# Patient Record
Sex: MALE | Race: BLACK OR AFRICAN AMERICAN | Employment: PART TIME | URBAN - METROPOLITAN AREA
[De-identification: names, ages, dates, MRNs, and addresses within clinical notes are randomized per-mention and may not be internally consistent; named-entity substitution may affect disease eponyms.]

---

## 2018-03-12 ENCOUNTER — PAT TELEPHONE (OUTPATIENT)
Dept: PREADMISSION TESTING | Age: 33
End: 2018-03-12

## 2018-03-12 ENCOUNTER — OFFICE VISIT (OUTPATIENT)
Dept: ORTHOPEDIC SURGERY | Age: 33
End: 2018-03-12

## 2018-03-12 VITALS — BODY MASS INDEX: 20.83 KG/M2 | WEIGHT: 125 LBS | HEIGHT: 65 IN

## 2018-03-12 VITALS
DIASTOLIC BLOOD PRESSURE: 72 MMHG | HEART RATE: 101 BPM | HEIGHT: 64 IN | BODY MASS INDEX: 21.34 KG/M2 | WEIGHT: 125 LBS | SYSTOLIC BLOOD PRESSURE: 112 MMHG | RESPIRATION RATE: 16 BRPM

## 2018-03-12 DIAGNOSIS — S62.336D CLOSED DISPLACED FRACTURE OF NECK OF FIFTH METACARPAL BONE OF RIGHT HAND WITH ROUTINE HEALING: ICD-10-CM

## 2018-03-12 PROCEDURE — 99203 OFFICE O/P NEW LOW 30 MIN: CPT | Performed by: PHYSICIAN ASSISTANT

## 2018-03-12 NOTE — PROGRESS NOTES
the distal Small Finger Metacarpal  on the Right, normal on the Left. The distal radius & ulna are not tender to palpation. There is a 0 degree angular deformity and no clinical evidence of  mal-rotation. Radiographic Evaluation:  Radiographs are reviewed  today (3 views of the right hand). They demonstrate evidence of an acute fracture of the distal  Small Finger Metacarpal  with no comminution, and moderate displcaement. There is not evidence of other injury or bony fracture. Impression:  Mr. Summer Rodas has sustained recent metacarpal fracture, displaced and presents requesting further treatment. Plan:  I have discussed with Mr. Summer Rodas the various treatment options for treatment of right  Small Finger Metacarpal fracture. We discussed the options of Closed treatment in situ, attempted closed reduction & cast immobilization, and Open Reduction & Internal Fixation of the fracture. I have explained the pre-, jaqui- ane post-operative considerations to him.  he has elected to proceed with surgical treatment Closed Reduction & Percutaneous Pinning of his fracture. He has voiced an understanding of the other options available to him. I have explained the complications, limitations, expectations, alternatives, & risks of his chosen treatment. We discussed the possibility of residual symptoms as may be related to surgical treatment of fractures including the possiblities of: mal-union, non-union, delayed union, persistant deformity, persistant pain, limitation of motion, future arthritic symptoms & the possible need for further treatment. We also specifically discussed risks related to any surgical procedure including: bleeding, infection, scar formation, & possible need for repeat operation. He understood our discussion and was comfortable with his decision; he was provided with appropriate expectations.     I had an extensive discussion with Mr. Summer Rodas  and any family members present regarding the natural history, etiology, and long term consequences of this problem. I have outlined a treatment plan with them and, in my opinion, surgical intervention is indicated at this time. I have discussed with them the potential complications, limitations, expectations, alternatives, and risks of the procedure. He has had full opportunity to ask their questions. I have answered them all to his satisfaction. I feel that the patient and any present family members do understand our discussion today and he has provided informed consent for Closed Reduction & Percutaneous Pinning of his  right  Small Finger Metacarpal fracture. He is appropriately immobilized and protected until the time of the scheduled surgery. He is specifically instructed to contact the office between now & his scheduled appointment if he has concerns related to the cast or the underlying fracture. He is welcome to call for an appointment sooner if he has any additional concerns or questions. I have also discussed with Mr. Sheila Obando the other treatment options available to him for this condition. We have today selected to proceed with Surgical treatment. He has voiced and  understanding that there are other less aggressive treatment options which are available in this situation, albeit possibly less efficacious or durable, and he is comfortable with the plan that he has chosen. Mr. Sheila Obando has been given a full verbal list of instructions and precautions related to his present condition. I have asked him to followup with me in the office at the prescribed time. He is also specifically requested to call or return to the office sooner if his symptoms change or worsen prior to the next scheduled appointment.

## 2018-03-12 NOTE — LETTER
[facility]  Surgery Scheduling Form:      DEMOGRAPHICS:                                                                                                              .    Patient Name:  Yakov Hamm  Patient :  1985   Patient SS#:  [id number]    Patient Phone:  [phone] (home)    Patient Address:  [place] [address] [place] [id number]    PCP:  Referring Not In System (Inactive)  Insurance:  Payor: / No coverage found. Insurance ID Number:    DIAGNOSIS & PROCEDURE:                                                                                            .    Diagnosis:   right Small Finger Metacarpal Fracture  (815.00)  Operation:  Closed Reduction & Percutaneous Pinning of right Small Finger Metacarpal Fracture  Location:  [facility]  Surgeon:  Corazon Sesay    SCHEDULING INFORMATION:                                                                                         .  Surgeon's Scheduling Instruction:  within 7 days    Requested Date:  3-13  OR Time:  1:00 Patient Arrival Time:  11:30    OR Time Required:  45  Minutes  Anesthesia:  General  Equipment:  K-Wires, TPS  Mini C-Arm:  Yes   Standard C-Arm:  No  Status:  outpatient  PAT Required:  Yes  Comments:                      New Post President.  Stephani Portillo MD  3/12/18   BILLING INFORMATION:                                                                                                    .    Procedure:       CPT Code Modifier
? Anesthetic and/or Medical Risks  ? 4. I have also been informed by the informing physician that there are other risks from both known and unknown causes that are attendant to the performance of any surgical procedure. I am aware that the practice of medicine and surgery is not an exact science, and that no guarantees have been made to me concerning the results of the operation and/or procedure(s). 5. I   CONSENT / REFUSE CONSENT  (strike the phrase that does not apply) to the taking of photographs before, during and/or after the operation or procedure for scientific/educational purposes. 6. I consent to the administration of anesthesia and to the use of such anesthetics as may be deemed advisable by the anesthesiologist who has been engaged by me or my physician. 7. I certify that I have read and understand the above consent to operation and/or other procedure(s); that the explanations therein referred to were made to me by the informing physician in advance of my signing this consent; that all blanks or statements requiring insertion or completion were filled in and inapplicable paragraphs, if any, were stricken before I signed; and that all questions asked by me about the operation and/or procedure(s) which I have consented to have been fully answered in a satisfactory manner.               _______________________  Witness        Signature Of Patient          Juli President. Stephani Portillo      _______________________  Informing Physician         Signature of Informing Physician           If patient is unable to sign or is a minor, complete one of the following:    (A)  Patient is a minor   years of age. (B)  Patient is unable to sign because: The undersigned represents that he or she is duly authorized to execute this consent for and on behalf of the above named patient.                Witness               o  Parent  o  Guardian   o  Spouse       o  Other (specify)

## 2018-03-13 ENCOUNTER — HOSPITAL ENCOUNTER (OUTPATIENT)
Dept: SURGERY | Age: 33
Discharge: HOME | End: 2018-03-13
Attending: ORTHOPAEDIC SURGERY | Admitting: ORTHOPAEDIC SURGERY

## 2018-03-13 VITALS
SYSTOLIC BLOOD PRESSURE: 103 MMHG | HEART RATE: 75 BPM | RESPIRATION RATE: 16 BRPM | DIASTOLIC BLOOD PRESSURE: 69 MMHG | WEIGHT: 128.64 LBS | TEMPERATURE: 97 F | BODY MASS INDEX: 21.41 KG/M2 | OXYGEN SATURATION: 99 %

## 2018-03-13 DIAGNOSIS — S62.336D CLOSED DISPLACED FRACTURE OF NECK OF FIFTH METACARPAL BONE OF RIGHT HAND WITH ROUTINE HEALING: Primary | ICD-10-CM

## 2018-03-13 RX ORDER — OXYCODONE HYDROCHLORIDE AND ACETAMINOPHEN 5; 325 MG/1; MG/1
1 TABLET ORAL PRN
Status: COMPLETED | OUTPATIENT
Start: 2018-03-13 | End: 2018-03-13

## 2018-03-13 RX ORDER — SODIUM CHLORIDE 9 MG/ML
INJECTION, SOLUTION INTRAVENOUS CONTINUOUS
Status: DISCONTINUED | OUTPATIENT
Start: 2018-03-13 | End: 2018-03-14 | Stop reason: HOSPADM

## 2018-03-13 RX ORDER — MORPHINE SULFATE 2 MG/ML
2 INJECTION, SOLUTION INTRAMUSCULAR; INTRAVENOUS EVERY 5 MIN PRN
Status: DISCONTINUED | OUTPATIENT
Start: 2018-03-13 | End: 2018-03-14 | Stop reason: HOSPADM

## 2018-03-13 RX ORDER — DIPHENHYDRAMINE HYDROCHLORIDE 50 MG/ML
12.5 INJECTION INTRAMUSCULAR; INTRAVENOUS ONCE
Status: COMPLETED | OUTPATIENT
Start: 2018-03-13 | End: 2018-03-13

## 2018-03-13 RX ORDER — FENTANYL CITRATE 50 UG/ML
50 INJECTION, SOLUTION INTRAMUSCULAR; INTRAVENOUS EVERY 5 MIN PRN
Status: DISCONTINUED | OUTPATIENT
Start: 2018-03-13 | End: 2018-03-14 | Stop reason: HOSPADM

## 2018-03-13 RX ORDER — ONDANSETRON 2 MG/ML
4 INJECTION INTRAMUSCULAR; INTRAVENOUS
Status: ACTIVE | OUTPATIENT
Start: 2018-03-13 | End: 2018-03-13

## 2018-03-13 RX ORDER — SODIUM CHLORIDE 0.9 % (FLUSH) 0.9 %
10 SYRINGE (ML) INJECTION PRN
Status: DISCONTINUED | OUTPATIENT
Start: 2018-03-13 | End: 2018-03-14 | Stop reason: HOSPADM

## 2018-03-13 RX ORDER — DIPHENHYDRAMINE HYDROCHLORIDE 50 MG/ML
INJECTION INTRAMUSCULAR; INTRAVENOUS
Status: DISCONTINUED
Start: 2018-03-13 | End: 2018-03-14 | Stop reason: HOSPADM

## 2018-03-13 RX ORDER — MEPERIDINE HYDROCHLORIDE 25 MG/ML
12.5 INJECTION INTRAMUSCULAR; INTRAVENOUS; SUBCUTANEOUS EVERY 5 MIN PRN
Status: DISCONTINUED | OUTPATIENT
Start: 2018-03-13 | End: 2018-03-14 | Stop reason: HOSPADM

## 2018-03-13 RX ORDER — SODIUM CHLORIDE 0.9 % (FLUSH) 0.9 %
10 SYRINGE (ML) INJECTION EVERY 12 HOURS SCHEDULED
Status: DISCONTINUED | OUTPATIENT
Start: 2018-03-13 | End: 2018-03-14 | Stop reason: HOSPADM

## 2018-03-13 RX ORDER — FENTANYL CITRATE 50 UG/ML
25 INJECTION, SOLUTION INTRAMUSCULAR; INTRAVENOUS EVERY 5 MIN PRN
Status: DISCONTINUED | OUTPATIENT
Start: 2018-03-13 | End: 2018-03-14 | Stop reason: HOSPADM

## 2018-03-13 RX ORDER — OXYCODONE HYDROCHLORIDE AND ACETAMINOPHEN 5; 325 MG/1; MG/1
2 TABLET ORAL PRN
Status: COMPLETED | OUTPATIENT
Start: 2018-03-13 | End: 2018-03-13

## 2018-03-13 RX ORDER — HYDROCODONE BITARTRATE AND ACETAMINOPHEN 5; 325 MG/1; MG/1
1 TABLET ORAL EVERY 6 HOURS PRN
Qty: 20 TABLET | Refills: 0 | Status: SHIPPED | OUTPATIENT
Start: 2018-03-13 | End: 2018-03-26 | Stop reason: SDUPTHER

## 2018-03-13 RX ORDER — MORPHINE SULFATE 2 MG/ML
1 INJECTION, SOLUTION INTRAMUSCULAR; INTRAVENOUS EVERY 5 MIN PRN
Status: DISCONTINUED | OUTPATIENT
Start: 2018-03-13 | End: 2018-03-14 | Stop reason: HOSPADM

## 2018-03-13 RX ADMIN — FENTANYL CITRATE 50 MCG: 50 INJECTION, SOLUTION INTRAMUSCULAR; INTRAVENOUS at 13:20

## 2018-03-13 RX ADMIN — DIPHENHYDRAMINE HYDROCHLORIDE 12.5 MG: 50 INJECTION INTRAMUSCULAR; INTRAVENOUS at 13:33

## 2018-03-13 RX ADMIN — MORPHINE SULFATE 2 MG: 2 INJECTION, SOLUTION INTRAMUSCULAR; INTRAVENOUS at 13:03

## 2018-03-13 RX ADMIN — OXYCODONE HYDROCHLORIDE AND ACETAMINOPHEN 2 TABLET: 5; 325 TABLET ORAL at 14:07

## 2018-03-13 RX ADMIN — FENTANYL CITRATE 50 MCG: 50 INJECTION, SOLUTION INTRAMUSCULAR; INTRAVENOUS at 13:09

## 2018-03-13 RX ADMIN — SODIUM CHLORIDE: 9 INJECTION, SOLUTION INTRAVENOUS at 11:40

## 2018-03-13 ASSESSMENT — PAIN SCALES - GENERAL
PAINLEVEL_OUTOF10: 10
PAINLEVEL_OUTOF10: 9
PAINLEVEL_OUTOF10: 7
PAINLEVEL_OUTOF10: 10
PAINLEVEL_OUTOF10: 9
PAINLEVEL_OUTOF10: 0

## 2018-03-13 ASSESSMENT — PAIN DESCRIPTION - PAIN TYPE: TYPE: SURGICAL PAIN

## 2018-03-13 ASSESSMENT — PAIN DESCRIPTION - LOCATION: LOCATION: HAND

## 2018-03-13 ASSESSMENT — LIFESTYLE VARIABLES: SMOKING_STATUS: 1

## 2018-03-13 ASSESSMENT — ENCOUNTER SYMPTOMS: SHORTNESS OF BREATH: 0

## 2018-03-13 ASSESSMENT — PAIN DESCRIPTION - ORIENTATION: ORIENTATION: RIGHT

## 2018-03-13 ASSESSMENT — PAIN - FUNCTIONAL ASSESSMENT: PAIN_FUNCTIONAL_ASSESSMENT: 0-10

## 2018-03-13 NOTE — ANESTHESIA PRE-OP
GLUCOSE, PROT, CALCIUM, BILITOT, ALKPHOS, AST, ALT  BMP  No results found for: NA, K, CL, CO2, BUN, CREATININE, CALCIUM, GFRAA, LABGLOM, GLUCOSE  POCGlucose  No results for input(s): GLUCOSE in the last 72 hours. Coags  No results found for: PROTIME, INR, APTT  HCG (If Applicable) No results found for: PREGTESTUR, PREGSERUM, HCG, HCGQUANT   ABGs No results found for: PHART, PO2ART, HHG4PTA, IXF9SLI, BEART, B6RYZLUX   Type & Screen (If Applicable)  No results found for: LABABO, [address]    Surgeon:    Procedure:    Medications prior to admission:   Prior to Admission medications    Medication Sig Start Date End Date Taking?  Authorizing Provider   ibuprofen (ADVIL;MOTRIN) 600 MG tablet Take 1 tablet by mouth every 8 hours as needed for Pain 3/4/18   Radha Epps MD   naproxen (NAPROSYN) 375 MG tablet Take 1 tablet by mouth 2 times daily 8/21/17   Millie Russell MD       Current medications:    Current Outpatient Prescriptions   Medication Sig Dispense Refill    ibuprofen (ADVIL;MOTRIN) 600 MG tablet Take 1 tablet by mouth every 8 hours as needed for Pain 20 tablet 0    naproxen (NAPROSYN) 375 MG tablet Take 1 tablet by mouth 2 times daily 17 tablet 0     Current Facility-Administered Medications   Medication Dose Route Frequency Provider Last Rate Last Dose    0.9 % sodium chloride infusion   Intravenous Continuous Leydi Pineda MD        sodium chloride flush 0.9 % injection 10 mL  10 mL Intravenous 2 times per day Leydi Pineda MD        sodium chloride flush 0.9 % injection 10 mL  10 mL Intravenous PRN Leydi Pineda MD        famotidine (PEPCID) injection 20 mg  20 mg Intravenous Once Leydi Pineda MD        ceFAZolin (ANCEF) 2 g in dextrose 5 % 100 mL IVPB  2 g Intravenous Once Blake MD Palomo        fentaNYL (SUBLIMAZE) injection 25 mcg  25 mcg Intravenous Q5 Min PRN Lexie Bueno MD        fentaNYL (SUBLIMAZE) injection 50 mcg  50 mcg Intravenous Q5 Min PRN Carol Goltz MD Jennifer        morphine (PF) injection 1 mg  1 mg Intravenous Q5 Min PRN Zhane Abraham MD        morphine (PF) injection 2 mg  2 mg Intravenous Q5 Min PRN Zhane Abraham MD        oxyCODONE-acetaminophen (PERCOCET) 5-325 MG per tablet 1 tablet  1 tablet Oral PRN Zhane Abraham MD        Or    oxyCODONE-acetaminophen (PERCOCET) 5-325 MG per tablet 2 tablet  2 tablet Oral PRN Zhane Abraham MD        ondansetron [facility]) injection 4 mg  4 mg Intravenous Once PRN Zhane Abraham MD        meperidine (DEMEROL) injection 12.5 mg  12.5 mg Intravenous Q5 Min PRN Zhane Abraham MD           Allergies:  No Known Allergies    Problem List:    Patient Active Problem List   Diagnosis Code    Closed displaced fracture of neck of fifth metacarpal bone of right hand with routine healing S62.336D       Past Medical History:  No past medical history on file. Past Surgical History:        Procedure Laterality Date    FRACTURE SURGERY      2003 I/D infection       Social History:    Social History   Substance Use Topics    Smoking status: Current Every Day Smoker     Packs/day: 0.50     Years: 10.00    Smokeless tobacco: Current User    Alcohol use Yes      Comment: SOCIAL                                Ready to quit: Not Answered  Counseling given: Not Answered      Vital Signs (Current): There were no vitals filed for this visit. BP Readings from Last 3 Encounters:   03/12/18 112/72   03/04/18 107/71   08/21/17 (!) 131/95       NPO Status:  mn+, see mar                                                                               BMI:   Wt Readings from Last 3 Encounters:   03/12/18 125 lb (56.7 kg)   03/12/18 125 lb (56.7 kg)   03/04/18 125 lb 3.5 oz (56.8 kg)     There is no height or weight on file to calculate BMI.     Anesthesia Evaluation  Patient summary reviewed no history of anesthetic complications: Airway: Mallampati: II  TM distance: >3 FB     Mouth opening: > = 3 FB Dental:          Pulmonary:   (+) current smoker (1/2 ppd)    (-) COPD, asthma and shortness of breath          Patient smoked on day of surgery. Cardiovascular:Negative CV ROS            Rhythm: regular  Rate: normal           Beta Blocker:  Not on Beta Blocker         Neuro/Psych:   Negative Neuro/Psych ROS  (+) psychiatric history (MJ use , this early am):            GI/Hepatic/Renal: Neg GI/Hepatic/Renal ROS            Endo/Other: Negative Endo/Other ROS                    Abdominal:           Vascular:                                      Anesthesia Plan      general     ASA 2       Induction: intravenous. MIPS: Postoperative opioids intended and Prophylactic antiemetics administered. Anesthetic plan and risks discussed with patient and mother. Plan discussed with CRNA. This pre-anesthesia assessment may be used as a history and physical.    DOS STAFF ADDENDUM:    Pt seen and examined, chart reviewed (including anesthesia, drug and allergy history). No interval changes to history and physical examination. Anesthetic plan, risks, benefits, alternatives, and personnel involved discussed with patient. Patient verbalized an understanding and agrees to proceed.       Macrina Sanchez MD  March 13, 2018  11:15 AM      Macrina Sanchez MD   3/13/2018

## 2018-03-13 NOTE — OP NOTE
OPERATIVE REPORT              . Patient:  Tamara Pan    YOB: 1985  Date of Service:  3/13/2018   Location:  [facility]    Preoperative Diagnosis:  Right Small Finger Metacarpal  Neck unstable fracture      Postoperative Diagnosis:  Same      Procedure:  Closed reduction and percutaneous pinning of Right Small Finger Metacarpal  Neck fracture    Surgeon: Dariusz Lee MD    Anesthesia:  General         Blood Loss: Minimal    Complications: None                                                           Indications:  Mr. Tamara Pan  is a 28y.o. year old male with a Right Small Finger Metacarpal  Neck fracture which is unstable. I have discussed preoperatively with him  the complications, limitations, expectations, alternatives, and risks of surgical care. Mr. Tamara Pan has provided written informed consent to proceed. Procedure: After written consent was obtained and the proper site was identified and marked, Mr. Tamara Pan  was brought to the operating room and placed in the supine position on the operating room table. The Right arm was extended upon a hand table. A dose of preoperative antibiotics was administered and the Right upper extremity was prepped and draped in the usual sterile fashion. Under fluoroscopic guidance, a closed reduction of the Metacarpal  fracture was performed.  2 0.045 inch K-wires were percutaneously placed securing the fracture fragments in anatomic alignment, assuring that there was no distraction or malrotation. Final fluoroscopic images demonstrated anatomic alignment and appropriate reduction of the fracture fragments. The K-wires were checked for appropriate position and dimension. The wire ends were bent, cut short, & protective caps were applied. Local anesthestic was instilled for post-operative analgesia.  The pin sites were dressed with Adaptic, dry sterile dressings, and a very well padded short arm fiberglass cast was applied

## 2018-03-13 NOTE — ANESTHESIA POST-OP
[facility] Department of Anesthesiology  Post-Anesthesia Note       Name:  Feliberto Zheng                                         Age:  28 y.o.   MRN:  [id number]     Last Vitals & Oxygen Saturation: /69   Pulse 75   Temp 97 °F (36.1 °C) (Oral)   Resp 16   Wt 128 lb 10.2 oz (58.3 kg)   SpO2 99%   BMI 21.41 kg/m²   Patient Vitals for the past 4 hrs:   BP Temp Temp src Pulse Resp SpO2   03/13/18 1347 103/69 97 °F (36.1 °C) Oral 75 16 99 %   03/13/18 1257 (!) 121/94 - - 75 12 100 %   03/13/18 1251 - - - 78 14 100 %   03/13/18 1250 (!) 113/90 - - 77 14 100 %   03/13/18 1249 - 97.1 °F (36.2 °C) Temporal 78 - 100 %       Level of consciousness: awake, alert and oriented    Respiratory: stable     Cardiovascular: stable     Hydration: stable     PONV: stable     Post-op pain: adequate analgesia    Post-op assessment: no apparent anesthetic complications and tolerated procedure well    Complications:  none    Macrina Sanchez MD  March 13, 2018   4:26 PM

## 2018-03-13 NOTE — PROGRESS NOTES
1.Verify patient. 2.Provide d/c instructions. 3.Understanding of d/c instructions. 4.Asess post-op  nausea , vomiting and pain. 5.Maintain core body temperature 96.8 or above. 6.Demonstrates post-op exercises. 7.Receive copy of d/c instructions. 8.Return to Pre anesthetic LOC/sensorimotor activity  9. IV d/c prior to d/c. Site unremarkable  10. Follow standard of care.

## 2018-03-14 ENCOUNTER — HOSPITAL ENCOUNTER (OUTPATIENT)
Dept: GENERAL RADIOLOGY | Age: 33
Discharge: HOME | End: 2018-03-15

## 2018-03-14 DIAGNOSIS — S62.336A CLOSED DISPLACED FRACTURE OF NECK OF FIFTH METACARPAL BONE OF RIGHT HAND, INITIAL ENCOUNTER: ICD-10-CM

## 2018-03-23 ENCOUNTER — OFFICE VISIT (OUTPATIENT)
Dept: ORTHOPEDIC SURGERY | Age: 33
End: 2018-03-23

## 2018-03-23 VITALS — WEIGHT: 125 LBS | RESPIRATION RATE: 16 BRPM | BODY MASS INDEX: 21.34 KG/M2 | HEIGHT: 64 IN

## 2018-03-23 DIAGNOSIS — S62.336D CLOSED DISPLACED FRACTURE OF NECK OF FIFTH METACARPAL BONE OF RIGHT HAND WITH ROUTINE HEALING: Primary | ICD-10-CM

## 2018-03-23 PROCEDURE — 29075 APPL CST ELBW FNGR SHORT ARM: CPT | Performed by: PHYSICIAN ASSISTANT

## 2018-03-23 PROCEDURE — 99024 POSTOP FOLLOW-UP VISIT: CPT | Performed by: PHYSICIAN ASSISTANT

## 2018-03-23 NOTE — PROGRESS NOTES
Rosy Soria PA-C. ordered a spica cast  to be applied to [facility] Eitan's right upper Hand. I applied a spica cast cast to [facility] Eitan's right upper Hand. I applied 1 rolls of under padding in a 50/50 fashion. The Ayse Wing requested black color fiberglass. I rolled 2 rolls of fiberglass in a 50/50 fashion. His right upper Hand is in a ulnar gutter position short arm fiberglass cast incorporating the Ring Finger and Small Finger in an intrinsic safe position Marcos Isaac PA-C . Angelo Laird's nail beds are pink in color, the extremity is warm to the touch. Capillary refill is less than 2 seconds. Ayse Wing instructed on proper care of cast.  Do not get wet, keep all items out of cast.  If cast is painful please make appointment to get checked. Patient also briefed on circulation compromise. If digits are cold, blue, and tingling patient must must seek care. If after hours patient is to go to Emergency Room. During office hours patient must come in to office.

## 2018-03-26 ENCOUNTER — TELEPHONE (OUTPATIENT)
Dept: ORTHOPEDIC SURGERY | Age: 33
End: 2018-03-26

## 2018-03-26 DIAGNOSIS — S62.336D CLOSED DISPLACED FRACTURE OF NECK OF FIFTH METACARPAL BONE OF RIGHT HAND WITH ROUTINE HEALING: ICD-10-CM

## 2018-03-26 RX ORDER — HYDROCODONE BITARTRATE AND ACETAMINOPHEN 5; 325 MG/1; MG/1
1 TABLET ORAL EVERY 6 HOURS PRN
Qty: 20 TABLET | Refills: 0 | Status: SHIPPED | OUTPATIENT
Start: 2018-03-26 | End: 2018-04-02

## 2018-04-23 ENCOUNTER — OFFICE VISIT (OUTPATIENT)
Dept: ORTHOPEDIC SURGERY | Age: 33
End: 2018-04-23

## 2018-04-23 VITALS — RESPIRATION RATE: 16 BRPM | HEIGHT: 64 IN | WEIGHT: 125 LBS | BODY MASS INDEX: 21.34 KG/M2

## 2018-04-23 DIAGNOSIS — S62.336D CLOSED DISPLACED FRACTURE OF NECK OF FIFTH METACARPAL BONE OF RIGHT HAND WITH ROUTINE HEALING: Primary | ICD-10-CM

## 2018-04-23 PROCEDURE — 99024 POSTOP FOLLOW-UP VISIT: CPT | Performed by: PHYSICIAN ASSISTANT

## 2018-04-23 PROCEDURE — APPSS15 APP SPLIT SHARED TIME 0-15 MINUTES: Performed by: PHYSICIAN ASSISTANT

## 2018-11-07 ENCOUNTER — HOSPITAL ENCOUNTER (EMERGENCY)
Age: 33
Discharge: HOME | End: 2018-11-07
Attending: EMERGENCY MEDICINE

## 2018-11-07 VITALS
WEIGHT: 131.39 LBS | RESPIRATION RATE: 12 BRPM | HEART RATE: 86 BPM | SYSTOLIC BLOOD PRESSURE: 118 MMHG | DIASTOLIC BLOOD PRESSURE: 74 MMHG | OXYGEN SATURATION: 97 % | TEMPERATURE: 98.1 F | BODY MASS INDEX: 22.55 KG/M2

## 2018-11-07 DIAGNOSIS — L03.211 CELLULITIS OF FACE: Primary | ICD-10-CM

## 2018-11-07 PROCEDURE — 99282 EMERGENCY DEPT VISIT SF MDM: CPT

## 2018-11-07 RX ORDER — SULFAMETHOXAZOLE AND TRIMETHOPRIM 800; 160 MG/1; MG/1
1 TABLET ORAL 2 TIMES DAILY
Qty: 20 TABLET | Refills: 0 | Status: SHIPPED | OUTPATIENT
Start: 2018-11-07 | End: 2018-11-17

## 2018-11-07 NOTE — ED PROVIDER NOTES
HEAD: Normocephalic. Atraumatic. EYES: EOM's grossly intact. Sclera anicteric. ENT: Mucous membranes are moist. Tolerates saliva. No trismus. NECK: Supple. No meningismus. Trachea midline. HEART: RRR. Radial pulses 2+. LUNGS: Respirations unlabored. CTAB  ABDOMEN: Soft. Non-tender. No guarding or rebound. EXTREMITIES: No acute deformities. SKIN: Warm and dry. The patient has a 2 x 2 similar area of cellulitis to the right shin without abscess  NEUROLOGICAL: No gross facial drooping. Moves all 4 extremities spontaneously. PSYCHIATRIC: Normal mood. I have reviewed and interpreted all of the currently available lab results from this visit (if applicable):  No results found for this visit on 11/07/18. Radiographs (if obtained):  [] The following radiograph was interpreted by myself in the absence of a radiologist:  [x] Radiologist's Report Reviewed:  n/a    EKG (if obtained): (All EKG's are interpreted by myself in the absence of a cardiologist)  Initial EKG on my interpretation shows n/a. MDM:  Differential diagnosis: Cellulitis, abscess, sepsis    The patient has no abscess; I do not believe he is septic based on vitals. He clearly does have a chin abscess from where he scratched himself on Friday and perceives take care of it with \"a rag\". I will clean it in the ED and then discharged on topical Bactroban as well as Bactrim along with close PCP follow-up. Discussed results, diagnosis and plan with patient and/or family. Questions addressed. Disposition and follow-up agreed upon. Specific discharge instructions explained. The patient and/or family and I have discussed the diagnosis and risks, and we agree with discharging home to follow-up with their primary care, specialist or referral doctor. In the event that medications were prescribed the risk profile of these medications were detailed expressly.  We also discussed returning to the Emergency Department immediately if new or worsening

## 2019-12-28 ENCOUNTER — HOSPITAL ENCOUNTER (EMERGENCY)
Age: 34
Discharge: HOME | End: 2019-12-28
Attending: EMERGENCY MEDICINE

## 2019-12-28 ENCOUNTER — APPOINTMENT (OUTPATIENT)
Dept: GENERAL RADIOLOGY | Age: 34
End: 2019-12-28

## 2019-12-28 VITALS
RESPIRATION RATE: 16 BRPM | WEIGHT: 134.48 LBS | BODY MASS INDEX: 22.96 KG/M2 | HEART RATE: 84 BPM | HEIGHT: 64 IN | DIASTOLIC BLOOD PRESSURE: 60 MMHG | SYSTOLIC BLOOD PRESSURE: 93 MMHG | OXYGEN SATURATION: 97 % | TEMPERATURE: 99.1 F

## 2019-12-28 DIAGNOSIS — J06.9 ACUTE UPPER RESPIRATORY INFECTION: Primary | ICD-10-CM

## 2019-12-28 LAB
A/G RATIO: 1.1 (ref 1.1–2.2)
ALBUMIN SERPL-MCNC: 3.7 G/DL (ref 3.4–5)
ALP BLD-CCNC: 63 U/L (ref 40–129)
ALT SERPL-CCNC: 23 U/L (ref 10–40)
ANION GAP SERPL CALCULATED.3IONS-SCNC: 14 MMOL/L (ref 3–16)
AST SERPL-CCNC: 30 U/L (ref 15–37)
BASOPHILS ABSOLUTE: 0.1 K/UL (ref 0–0.2)
BASOPHILS RELATIVE PERCENT: 0.9 %
BILIRUB SERPL-MCNC: 0.3 MG/DL (ref 0–1)
BUN BLDV-MCNC: 7 MG/DL (ref 7–20)
CALCIUM SERPL-MCNC: 9.2 MG/DL (ref 8.3–10.6)
CHLORIDE BLD-SCNC: 100 MMOL/L (ref 99–110)
CO2: 24 MMOL/L (ref 21–32)
CREAT SERPL-MCNC: 1 MG/DL (ref 0.9–1.3)
EOSINOPHILS ABSOLUTE: 0 K/UL (ref 0–0.6)
EOSINOPHILS RELATIVE PERCENT: 0.5 %
GFR AFRICAN AMERICAN: >60
GFR NON-AFRICAN AMERICAN: >60
GLOBULIN: 3.5 G/DL
GLUCOSE BLD-MCNC: 106 MG/DL (ref 70–99)
HCT VFR BLD CALC: 42.5 % (ref 40.5–52.5)
HEMOGLOBIN: 14.2 G/DL (ref 13.5–17.5)
LYMPHOCYTES ABSOLUTE: 3.5 K/UL (ref 1–5.1)
LYMPHOCYTES RELATIVE PERCENT: 50.6 %
MCH RBC QN AUTO: 33.1 PG (ref 26–34)
MCHC RBC AUTO-ENTMCNC: 33.4 G/DL (ref 31–36)
MCV RBC AUTO: 99.3 FL (ref 80–100)
MONOCYTES ABSOLUTE: 0.6 K/UL (ref 0–1.3)
MONOCYTES RELATIVE PERCENT: 9.4 %
NEUTROPHILS ABSOLUTE: 2.6 K/UL (ref 1.7–7.7)
NEUTROPHILS RELATIVE PERCENT: 38.6 %
PDW BLD-RTO: 13 % (ref 12.4–15.4)
PLATELET # BLD: 338 K/UL (ref 135–450)
PMV BLD AUTO: 7.4 FL (ref 5–10.5)
POTASSIUM REFLEX MAGNESIUM: 4.2 MMOL/L (ref 3.5–5.1)
RBC # BLD: 4.28 M/UL (ref 4.2–5.9)
SODIUM BLD-SCNC: 138 MMOL/L (ref 136–145)
TOTAL PROTEIN: 7.2 G/DL (ref 6.4–8.2)
WBC # BLD: 6.8 K/UL (ref 4–11)

## 2019-12-28 PROCEDURE — 85025 COMPLETE CBC W/AUTO DIFF WBC: CPT

## 2019-12-28 PROCEDURE — 36415 COLL VENOUS BLD VENIPUNCTURE: CPT

## 2019-12-28 PROCEDURE — 99283 EMERGENCY DEPT VISIT LOW MDM: CPT

## 2019-12-28 PROCEDURE — 71046 X-RAY EXAM CHEST 2 VIEWS: CPT

## 2019-12-28 PROCEDURE — 80053 COMPREHEN METABOLIC PANEL: CPT

## 2019-12-28 RX ORDER — PREDNISONE 50 MG/1
50 TABLET ORAL DAILY
Qty: 5 TABLET | Refills: 0 | Status: SHIPPED | OUTPATIENT
Start: 2019-12-28 | End: 2020-01-02

## 2019-12-28 RX ORDER — LIDOCAINE 50 MG/G
1 PATCH TOPICAL DAILY
Qty: 4 PATCH | Refills: 0 | Status: SHIPPED | OUTPATIENT
Start: 2019-12-28 | End: 2020-01-01

## 2019-12-28 RX ORDER — ALBUTEROL SULFATE 90 UG/1
2 AEROSOL, METERED RESPIRATORY (INHALATION) EVERY 4 HOURS PRN
Qty: 1 INHALER | Refills: 0 | Status: SHIPPED | OUTPATIENT
Start: 2019-12-28

## 2019-12-28 RX ORDER — AZITHROMYCIN 250 MG/1
TABLET, FILM COATED ORAL
Qty: 1 PACKET | Refills: 0 | Status: SHIPPED | OUTPATIENT
Start: 2019-12-28 | End: 2020-01-01

## 2019-12-28 ASSESSMENT — ENCOUNTER SYMPTOMS
EYE ITCHING: 0
CHOKING: 0
CHEST TIGHTNESS: 0
RECTAL PAIN: 0
COUGH: 1
BACK PAIN: 0
ABDOMINAL PAIN: 0
VOMITING: 0
EYE REDNESS: 0
BLOOD IN STOOL: 0
STRIDOR: 0
RHINORRHEA: 1
DIARRHEA: 0
NAUSEA: 0
SHORTNESS OF BREATH: 0
ANAL BLEEDING: 0
ABDOMINAL DISTENTION: 0
EYE PAIN: 0
COLOR CHANGE: 0
CONSTIPATION: 0
APNEA: 0
WHEEZING: 0
EYE DISCHARGE: 0
PHOTOPHOBIA: 0

## 2019-12-28 ASSESSMENT — PAIN - FUNCTIONAL ASSESSMENT
PAIN_FUNCTIONAL_ASSESSMENT: 0-10
PAIN_FUNCTIONAL_ASSESSMENT: ACTIVITIES ARE NOT PREVENTED
PAIN_FUNCTIONAL_ASSESSMENT: ACTIVITIES ARE NOT PREVENTED

## 2019-12-28 ASSESSMENT — PAIN DESCRIPTION - PROGRESSION
CLINICAL_PROGRESSION: GRADUALLY WORSENING
CLINICAL_PROGRESSION: NOT CHANGED

## 2019-12-28 ASSESSMENT — PAIN SCALES - GENERAL
PAINLEVEL_OUTOF10: 3
PAINLEVEL_OUTOF10: 5

## 2019-12-28 ASSESSMENT — PAIN DESCRIPTION - ORIENTATION
ORIENTATION: RIGHT
ORIENTATION: RIGHT;LOWER

## 2019-12-28 ASSESSMENT — PAIN DESCRIPTION - ONSET
ONSET: ON-GOING
ONSET: ON-GOING

## 2019-12-28 ASSESSMENT — PAIN DESCRIPTION - PAIN TYPE
TYPE: ACUTE PAIN
TYPE: ACUTE PAIN

## 2019-12-28 ASSESSMENT — PAIN DESCRIPTION - LOCATION
LOCATION: RIB CAGE
LOCATION: RIB CAGE

## 2019-12-28 ASSESSMENT — PAIN DESCRIPTION - DESCRIPTORS
DESCRIPTORS: ACHING;SORE;SHARP
DESCRIPTORS: SHOOTING

## 2019-12-28 ASSESSMENT — PAIN DESCRIPTION - FREQUENCY
FREQUENCY: INTERMITTENT
FREQUENCY: CONTINUOUS

## 2023-05-07 ENCOUNTER — HOSPITAL ENCOUNTER (EMERGENCY)
Age: 38
Discharge: HOME | End: 2023-05-07
Attending: EMERGENCY MEDICINE

## 2023-05-07 VITALS
RESPIRATION RATE: 18 BRPM | BODY MASS INDEX: 23.34 KG/M2 | OXYGEN SATURATION: 98 % | HEART RATE: 65 BPM | TEMPERATURE: 99.6 F | HEIGHT: 64 IN | DIASTOLIC BLOOD PRESSURE: 87 MMHG | WEIGHT: 136.69 LBS | SYSTOLIC BLOOD PRESSURE: 137 MMHG

## 2023-05-07 DIAGNOSIS — R68.83 CHILLS: ICD-10-CM

## 2023-05-07 DIAGNOSIS — U07.1 COVID-19: Primary | ICD-10-CM

## 2023-05-07 DIAGNOSIS — R51.9 ACUTE NONINTRACTABLE HEADACHE, UNSPECIFIED HEADACHE TYPE: ICD-10-CM

## 2023-05-07 DIAGNOSIS — M79.10 MYALGIA: ICD-10-CM

## 2023-05-07 LAB — SARS-COV-2 RDRP RESP QL NAA+PROBE: DETECTED

## 2023-05-07 PROCEDURE — 99283 EMERGENCY DEPT VISIT LOW MDM: CPT

## 2023-05-07 PROCEDURE — 87635 SARS-COV-2 COVID-19 AMP PRB: CPT

## 2023-05-07 PROCEDURE — 6370000000 HC RX 637 (ALT 250 FOR IP): Performed by: EMERGENCY MEDICINE

## 2023-05-07 RX ORDER — ACETAMINOPHEN 500 MG
1000 TABLET ORAL EVERY 8 HOURS PRN
Qty: 40 TABLET | Refills: 0 | Status: SHIPPED | OUTPATIENT
Start: 2023-05-07

## 2023-05-07 RX ORDER — CYCLOBENZAPRINE HCL 5 MG
5 TABLET ORAL 2 TIMES DAILY PRN
Qty: 10 TABLET | Refills: 0 | Status: SHIPPED | OUTPATIENT
Start: 2023-05-07 | End: 2023-05-17

## 2023-05-07 RX ORDER — ACETAMINOPHEN 500 MG
1000 TABLET ORAL ONCE
Status: COMPLETED | OUTPATIENT
Start: 2023-05-07 | End: 2023-05-07

## 2023-05-07 RX ORDER — IBUPROFEN 600 MG/1
600 TABLET ORAL EVERY 6 HOURS PRN
Qty: 40 TABLET | Refills: 0 | Status: SHIPPED | OUTPATIENT
Start: 2023-05-07

## 2023-05-07 RX ADMIN — ACETAMINOPHEN 1000 MG: 500 TABLET ORAL at 14:31

## 2023-05-07 ASSESSMENT — ENCOUNTER SYMPTOMS
SHORTNESS OF BREATH: 0
DIARRHEA: 0
SORE THROAT: 0
ABDOMINAL PAIN: 0
NAUSEA: 0
COUGH: 0
BACK PAIN: 1
VOMITING: 0

## 2023-05-07 ASSESSMENT — PAIN DESCRIPTION - DESCRIPTORS
DESCRIPTORS: DISCOMFORT
DESCRIPTORS: PATIENT UNABLE TO DESCRIBE

## 2023-05-07 ASSESSMENT — PAIN DESCRIPTION - LOCATION
LOCATION: GENERALIZED
LOCATION: GENERALIZED

## 2023-05-07 ASSESSMENT — PAIN SCALES - GENERAL
PAINLEVEL_OUTOF10: 10
PAINLEVEL_OUTOF10: 10

## 2023-05-07 ASSESSMENT — PAIN DESCRIPTION - PAIN TYPE: TYPE: ACUTE PAIN

## 2023-05-07 ASSESSMENT — PAIN - FUNCTIONAL ASSESSMENT
PAIN_FUNCTIONAL_ASSESSMENT: ACTIVITIES ARE NOT PREVENTED
PAIN_FUNCTIONAL_ASSESSMENT: 0-10
PAIN_FUNCTIONAL_ASSESSMENT: ACTIVITIES ARE NOT PREVENTED
PAIN_FUNCTIONAL_ASSESSMENT: 0-10

## 2024-02-24 ENCOUNTER — HOSPITAL ENCOUNTER (EMERGENCY)
Age: 39
Discharge: HOME | End: 2024-02-24
Attending: STUDENT IN AN ORGANIZED HEALTH CARE EDUCATION/TRAINING PROGRAM
Payer: COMMERCIAL

## 2024-02-24 VITALS
WEIGHT: 142.8 LBS | TEMPERATURE: 98.1 F | HEART RATE: 68 BPM | HEIGHT: 64 IN | SYSTOLIC BLOOD PRESSURE: 143 MMHG | DIASTOLIC BLOOD PRESSURE: 82 MMHG | RESPIRATION RATE: 16 BRPM | BODY MASS INDEX: 24.38 KG/M2

## 2024-02-24 DIAGNOSIS — M54.50 ACUTE RIGHT-SIDED LOW BACK PAIN WITHOUT SCIATICA: Primary | ICD-10-CM

## 2024-02-24 PROCEDURE — 6360000002 HC RX W HCPCS: Performed by: PHYSICIAN ASSISTANT

## 2024-02-24 PROCEDURE — 96372 THER/PROPH/DIAG INJ SC/IM: CPT

## 2024-02-24 PROCEDURE — 6370000000 HC RX 637 (ALT 250 FOR IP): Performed by: PHYSICIAN ASSISTANT

## 2024-02-24 PROCEDURE — 99284 EMERGENCY DEPT VISIT MOD MDM: CPT

## 2024-02-24 RX ORDER — NAPROXEN 500 MG/1
500 TABLET ORAL 2 TIMES DAILY WITH MEALS
Qty: 30 TABLET | Refills: 0 | Status: SHIPPED | OUTPATIENT
Start: 2024-02-24

## 2024-02-24 RX ORDER — LIDOCAINE 50 MG/G
1 PATCH TOPICAL DAILY
Qty: 10 PATCH | Refills: 0 | Status: SHIPPED | OUTPATIENT
Start: 2024-02-24 | End: 2024-03-05

## 2024-02-24 RX ORDER — METHOCARBAMOL 750 MG/1
750 TABLET, FILM COATED ORAL 4 TIMES DAILY
Qty: 40 TABLET | Refills: 0 | Status: SHIPPED | OUTPATIENT
Start: 2024-02-24 | End: 2024-03-05

## 2024-02-24 RX ORDER — DIAZEPAM 5 MG/1
5 TABLET ORAL ONCE
Status: COMPLETED | OUTPATIENT
Start: 2024-02-24 | End: 2024-02-24

## 2024-02-24 RX ORDER — LIDOCAINE 4 G/G
1 PATCH TOPICAL DAILY
Status: DISCONTINUED | OUTPATIENT
Start: 2024-02-24 | End: 2024-02-24 | Stop reason: HOSPADM

## 2024-02-24 RX ORDER — KETOROLAC TROMETHAMINE 30 MG/ML
15 INJECTION, SOLUTION INTRAMUSCULAR; INTRAVENOUS ONCE
Status: COMPLETED | OUTPATIENT
Start: 2024-02-24 | End: 2024-02-24

## 2024-02-24 RX ADMIN — DIAZEPAM 5 MG: 5 TABLET ORAL at 12:05

## 2024-02-24 RX ADMIN — KETOROLAC TROMETHAMINE 15 MG: 30 INJECTION, SOLUTION INTRAMUSCULAR; INTRAVENOUS at 12:04

## 2024-02-24 ASSESSMENT — ENCOUNTER SYMPTOMS
SHORTNESS OF BREATH: 0
ABDOMINAL PAIN: 0
BACK PAIN: 1
VOMITING: 0
NAUSEA: 0
DIARRHEA: 0
CONSTIPATION: 0
RESPIRATORY NEGATIVE: 1
CHEST TIGHTNESS: 0

## 2024-02-24 ASSESSMENT — PAIN - FUNCTIONAL ASSESSMENT: PAIN_FUNCTIONAL_ASSESSMENT: 0-10

## 2024-02-24 ASSESSMENT — PAIN DESCRIPTION - FREQUENCY: FREQUENCY: CONTINUOUS

## 2024-02-24 ASSESSMENT — PAIN DESCRIPTION - PAIN TYPE: TYPE: ACUTE PAIN

## 2024-02-24 ASSESSMENT — PAIN DESCRIPTION - LOCATION: LOCATION: FLANK

## 2024-02-24 ASSESSMENT — PAIN SCALES - GENERAL
PAINLEVEL_OUTOF10: 10
PAINLEVEL_OUTOF10: 10

## 2024-02-24 ASSESSMENT — PAIN DESCRIPTION - ONSET: ONSET: SUDDEN

## 2024-02-24 ASSESSMENT — PAIN DESCRIPTION - ORIENTATION: ORIENTATION: RIGHT

## 2024-02-24 ASSESSMENT — PAIN DESCRIPTION - DESCRIPTORS: DESCRIPTORS: SHARP;THROBBING

## 2024-02-24 NOTE — ED PROVIDER NOTES
ED Attending Attestation Note     Date of evaluation: 2/24/2024    This patient was seen by the advance practice provider.  I have seen and examined the patient, agree with the workup, evaluation, management and diagnosis. The care plan has been discussed.  My assessment reveals 38-year-old male who presents with right-sided back pain.  Patient reports that he has had back pain since Wednesday.  He has not had any urinary symptoms.  He is overall well-appearing.  He has point tenderness to his right lower back.  No sensory deficits, other neurologic deficits to his bilateral lower extremities or groin.  No red flag back symptoms or signs.  He was given conservative pain management here in the emergency room with improvement of his pain.  Will send him home with back stretches as well as Robaxin and lidocaine patches..    Medical Decision Making  Problems Addressed:  Acute right-sided low back pain without sciatica: complicated acute illness or injury    Risk  OTC drugs.  Prescription drug management.  Decision regarding hospitalization.            Noble Huff MD  02/24/24 [id number]

## 2024-02-24 NOTE — DISCHARGE INSTRUCTIONS
Take naproxen twice per day every day with food for the next 3-5 days, then as needed for continued pain. You may take Robaxin as prescribed for severe pain and muscle spasm, but use caution, as this medication can cause sedation, and you should not drink alcohol, drive, or operate machinery while taking this medication.  You should do gentle stretching exercises as described below, to help loosen the tight and spasmed muscles in your back.  Over the next several days, you should maintain a gentle activity level and avoid bed rest, but do not stress your back. With back pain like this, about 50% of people feel better with application of ice packs, and 50% feel better with application of heat therapy, such as a warm compress or heating pad. You should choose whichever feels better for you. Please follow-up with your primary care provider in about one week if your pain has not begun to improve, as you may benefit from referral for physical therapy.  Please call your doctor, or return to the emergency department, if you have worsening pain, particularly if this is associated with numbness or weakness of your lower extremities, difficulty with bladder or bowel movements, vomiting, fevers, or other concerning symptoms.

## 2024-02-24 NOTE — ED PROVIDER NOTES
THE [facility]  EMERGENCY DEPARTMENT ENCOUNTER          PHYSICIAN ASSISTANT NOTE       Date of evaluation: 2/24/2024    Chief Complaint     Flank Pain      History of Present Illness     Angelo Laird is a 38 y.o. male who presents to the emergency department with right-sided mid back pain.  The patient states that started 3 days ago.  The pain is worse with movement and sometimes there with rest.  He cannot think of any injury or trauma.  He denies numbness or tingling of his extremities.  Denies fevers or chills.  Denies dysuria, hematuria or urinary frequency.  Denies abdominal or flank pain.  He describes it as a throbbing, sharp pain with certain movements that he rates 10 out of 10    ASSESSMENT / PLAN  (MEDICAL DECISION MAKING)     INITIAL VITALS: BP: (!) 143/82, Temp: 98.1 °F (36.7 °C), Pulse: 68, Respirations: 16,      Angelo aLird is a 38 y.o. male who presented to the emergency department with right-sided back pain.      Is this patient to be included in the SEP-1 core measure? No Exclusion criteria - the patient is NOT to be included for SEP-1 Core Measure due to: Infection is not suspected    Medical Decision Making  This is a 30-year-old gentleman who presented to the emergency department with right-sided back pain.  On examination he is neurovascularly intact and is ambulating without difficulty.  He has reproducible tenderness to palpation of the paravertebral musculature in the thoracic region with palpable muscle spasming.  He was given Valium 5 mg, Toradol and a lidocaine patch here.  Upon reexamination the patient states he has had some good relief of his pain and is moving with more ease.  He has no flank or abdominal pain, no dysuria, hematuria or urinary frequency my suspicion for kidney stone is low in this patient.  He will be discharged home and treated for right-sided back pain/muscular strain.  I sent prescriptions for lidocaine patch, Robaxin and naproxen to his pharmacy.  He was  mouth 4 times daily for 10 days WARNING: May cause drowsiness.  May impair ability to operate a motor vehicle or machinery.  Do not use in combination with alcohol     Dispense:  40 tablet     Refill:  0    naproxen (NAPROSYN) 500 MG tablet     Sig: Take 1 tablet by mouth 2 times daily (with meals)     Dispense:  30 tablet     Refill:  0       CONSULTS:  None    Review of Systems     Review of Systems   Constitutional:  Negative for chills and fever.   HENT: Negative.     Respiratory: Negative.  Negative for chest tightness and shortness of breath.    Cardiovascular: Negative.  Negative for chest pain.   Gastrointestinal:  Negative for abdominal pain, constipation, diarrhea, nausea and vomiting.   Genitourinary:  Negative for difficulty urinating, dysuria, flank pain, frequency, hematuria, penile discharge, penile pain, scrotal swelling and testicular pain.   Musculoskeletal:  Positive for back pain. Negative for neck pain.        Positive for right sided mid thoracic pain   Skin:  Negative for rash.   Neurological: Negative.  Negative for dizziness, light-headedness and headaches.   Psychiatric/Behavioral: Negative.     All other systems reviewed and are negative.      Past Medical, Surgical, Family, and Social History     He has no past medical history on file.  He has a past surgical history that includes fracture surgery and Finger fracture surgery (Right, 03/13/2018).  His family history includes Hypertension in his mother.  He reports that he has been smoking cigarettes. He has a 5.0 pack-year smoking history. He has never used smokeless tobacco. He reports current alcohol use. He reports that he does not use drugs.    Medications     Previous Medications    ACETAMINOPHEN (TYLENOL) 500 MG TABLET    Take 2 tablets by mouth every 8 hours as needed for Fever or Pain    IBUPROFEN (ADVIL;MOTRIN) 600 MG TABLET    Take 1 tablet by mouth every 6 hours as needed for Pain or Fever       Allergies     He has No Known